# Patient Record
Sex: MALE | Race: WHITE | NOT HISPANIC OR LATINO | Employment: UNEMPLOYED | URBAN - METROPOLITAN AREA
[De-identification: names, ages, dates, MRNs, and addresses within clinical notes are randomized per-mention and may not be internally consistent; named-entity substitution may affect disease eponyms.]

---

## 2023-07-12 ENCOUNTER — OFFICE VISIT (OUTPATIENT)
Dept: FAMILY MEDICINE | Facility: CLINIC | Age: 28
End: 2023-07-12
Payer: COMMERCIAL

## 2023-07-12 VITALS
TEMPERATURE: 98.6 F | OXYGEN SATURATION: 95 % | BODY MASS INDEX: 30.49 KG/M2 | RESPIRATION RATE: 18 BRPM | SYSTOLIC BLOOD PRESSURE: 122 MMHG | HEIGHT: 65 IN | HEART RATE: 78 BPM | WEIGHT: 183 LBS | DIASTOLIC BLOOD PRESSURE: 78 MMHG

## 2023-07-12 DIAGNOSIS — Z13.220 LIPID SCREENING: ICD-10-CM

## 2023-07-12 DIAGNOSIS — Z11.4 SCREENING FOR HIV (HUMAN IMMUNODEFICIENCY VIRUS): ICD-10-CM

## 2023-07-12 DIAGNOSIS — Z13.1 SCREENING FOR DIABETES MELLITUS: ICD-10-CM

## 2023-07-12 DIAGNOSIS — Z11.3 ROUTINE SCREENING FOR STI (SEXUALLY TRANSMITTED INFECTION): ICD-10-CM

## 2023-07-12 DIAGNOSIS — Z00.00 VISIT FOR PREVENTIVE HEALTH EXAMINATION: Primary | ICD-10-CM

## 2023-07-12 LAB
CHOLEST SERPL-MCNC: 175 MG/DL
FASTING STATUS PATIENT QL REPORTED: NO
GLUCOSE SERPL-MCNC: 96 MG/DL (ref 70–99)
HDLC SERPL-MCNC: 50 MG/DL
LDLC SERPL CALC-MCNC: 86 MG/DL
NONHDLC SERPL-MCNC: 125 MG/DL
T PALLIDUM AB SER QL: NONREACTIVE
TRIGL SERPL-MCNC: 197 MG/DL

## 2023-07-12 PROCEDURE — 91312 COVID-19 BIVALENT 12+ (PFIZER): CPT | Performed by: FAMILY MEDICINE

## 2023-07-12 PROCEDURE — 86780 TREPONEMA PALLIDUM: CPT | Performed by: FAMILY MEDICINE

## 2023-07-12 PROCEDURE — 90651 9VHPV VACCINE 2/3 DOSE IM: CPT | Performed by: FAMILY MEDICINE

## 2023-07-12 PROCEDURE — 0121A COVID-19 BIVALENT 12+ (PFIZER): CPT | Performed by: FAMILY MEDICINE

## 2023-07-12 PROCEDURE — 80061 LIPID PANEL: CPT | Performed by: FAMILY MEDICINE

## 2023-07-12 PROCEDURE — 87491 CHLMYD TRACH DNA AMP PROBE: CPT | Performed by: FAMILY MEDICINE

## 2023-07-12 PROCEDURE — 99385 PREV VISIT NEW AGE 18-39: CPT | Mod: 25 | Performed by: FAMILY MEDICINE

## 2023-07-12 PROCEDURE — 90471 IMMUNIZATION ADMIN: CPT | Performed by: FAMILY MEDICINE

## 2023-07-12 PROCEDURE — 87389 HIV-1 AG W/HIV-1&-2 AB AG IA: CPT | Performed by: FAMILY MEDICINE

## 2023-07-12 PROCEDURE — 82947 ASSAY GLUCOSE BLOOD QUANT: CPT | Performed by: FAMILY MEDICINE

## 2023-07-12 PROCEDURE — 87591 N.GONORRHOEAE DNA AMP PROB: CPT | Performed by: FAMILY MEDICINE

## 2023-07-12 PROCEDURE — 36415 COLL VENOUS BLD VENIPUNCTURE: CPT | Performed by: FAMILY MEDICINE

## 2023-07-12 ASSESSMENT — ENCOUNTER SYMPTOMS
HEARTBURN: 0
NAUSEA: 0
WEAKNESS: 0
HEADACHES: 0
DIZZINESS: 0
HEMATURIA: 0
MYALGIAS: 0
ABDOMINAL PAIN: 0
EYE PAIN: 0
HEMATOCHEZIA: 0
DYSURIA: 0
COUGH: 0
CHILLS: 0
PALPITATIONS: 0
PARESTHESIAS: 0
SORE THROAT: 0
JOINT SWELLING: 0
ARTHRALGIAS: 0
NERVOUS/ANXIOUS: 0
DIARRHEA: 0
FEVER: 0
CONSTIPATION: 0
SHORTNESS OF BREATH: 0
FREQUENCY: 0

## 2023-07-12 ASSESSMENT — PAIN SCALES - GENERAL: PAINLEVEL: NO PAIN (0)

## 2023-07-12 NOTE — PROGRESS NOTES
"Assessment/Plan.    Preventive.  -see below orders for screening tests and immunizations  -STI screening: female partner in past, no prior testing    Orders Placed This Encounter   Procedures     HPV9 (GARDASIL 9)     COVID-19 BIVALENT 12+ (PFIZER)     HIV Antigen Antibody Combo     Treponema Abs w Reflex to RPR and Titer     Lipid panel reflex to direct LDL Non-fasting     Glucose     ----  Chief complaint: Physical (Also wanting to get HPV vaccine)    Social History     Social History Narrative    Updated 7/12/2023:  Grew up in [place].  Lives with a friend.  No pets.  No kids.  Plans to start a PhD program in fungal ecology at [employer] in fall 2023.     Patient has been advised of split billing: yes.    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  He had significant GI symptoms at that time.  He was then told that sunflower butter was being recalled due to Listeria contamination.  He stopped consuming the sunflower butter and his symptoms improved.  He was never tested for Listeria.    History of positional tachycardia.  No symptoms recently.    Exam  /78 (BP Location: Left arm, Patient Position: Sitting, Cuff Size: Adult Regular)   Pulse 78   Temp 98.6  F (37  C) (Temporal)   Resp 18   Ht 1.66 m (5' 5.35\")   Wt 83 kg (183 lb)   SpO2 95%   BMI 30.12 kg/m       Oropharynx without abnormal masses.  No cervical adenopathy.  Lung fields clear to auscultation bilaterally.  Heart with regular rate and rhythm, no murmurs.  No lower leg edema.  "

## 2023-07-12 NOTE — PROGRESS NOTES
"SUBJECTIVE:   CC: Srinath is an 27 year old who presents for preventative health visit.       7/12/2023     3:03 PM   Additional Questions   Roomed by Mark PEÑALOZA     Healthy Habits:     Getting at least 3 servings of Calcium per day:  NO    Bi-annual eye exam:  NO    Dental care twice a year:  NO    Sleep apnea or symptoms of sleep apnea:  None    Diet:  Regular (no restrictions)    Frequency of exercise:  1 day/week    Duration of exercise:  15-30 minutes    Taking medications regularly:  Not Applicable    Medication side effects:  Not applicable    Additional concerns today:  Yes      Today's PHQ-2 Score:       7/12/2023     2:45 PM   PHQ-2 ( 1999 [employer])   Q1: Little interest or pleasure in doing things 0   Q2: Feeling down, depressed or hopeless 1   PHQ-2 Score 1   Q1: Little interest or pleasure in doing things Not at all   Q2: Feeling down, depressed or hopeless Several days   PHQ-2 Score 1         {Outside tests to abstract? :870524}    {additional problems to add (Optional):279504}  Have you ever done Advance Care Planning? (For example, a Health Directive, POLST, or a discussion with a medical provider or your loved ones about your wishes): { :175268}    Social History     Tobacco Use     Smoking status: Never     Smokeless tobacco: Never   Substance Use Topics     Alcohol use: Not on file     {Rooming staff  Click this link to complete the Prescreen if response below is not for today's visit  Alcohol Use Prescreen >3 drinks/day or > 7 drinks/week.  If the prescreen question answer is YES, complete the full AUDIT  :820381}        7/12/2023     2:44 PM   Alcohol Use   Prescreen: >3 drinks/day or >7 drinks/week? No          No data to display                Last PSA: No results found for: PSA    Reviewed orders with patient. Reviewed health maintenance and updated orders accordingly - { :518049::\"Yes\"}  {Chronicprobdata (optional):621352}    Reviewed and updated as needed this visit by clinical staff    " "Allergies  Meds              Reviewed and updated as needed this visit by Provider                 {HISTORY OPTIONS (Optional):986648}    Review of Systems   Constitutional: Negative for chills and fever.   HENT: Negative for congestion, ear pain, hearing loss and sore throat.    Eyes: Negative for pain and visual disturbance.   Respiratory: Negative for cough and shortness of breath.    Cardiovascular: Negative for chest pain, palpitations and peripheral edema.   Gastrointestinal: Negative for abdominal pain, constipation, diarrhea, heartburn, hematochezia and nausea.   Genitourinary: Negative for dysuria, frequency, genital sores, hematuria, impotence, penile discharge and urgency.   Musculoskeletal: Negative for arthralgias, joint swelling and myalgias.   Skin: Negative for rash.   Neurological: Negative for dizziness, weakness, headaches and paresthesias.   Psychiatric/Behavioral: Negative for mood changes. The patient is not nervous/anxious.      {MALE ROS (Optional):990225::\"CONSTITUTIONAL: NEGATIVE for fever, chills, change in weight\",\"INTEGUMENTARY/SKIN: NEGATIVE for worrisome rashes, moles or lesions\",\"EYES: NEGATIVE for vision changes or irritation\",\"ENT: NEGATIVE for ear, mouth and throat problems\",\"RESP: NEGATIVE for significant cough or SOB\",\"CV: NEGATIVE for chest pain, palpitations or peripheral edema\",\"GI: NEGATIVE for nausea, abdominal pain, heartburn, or change in bowel habits\",\" male: negative for dysuria, hematuria, decreased urinary stream, erectile dysfunction, urethral discharge\",\"MUSCULOSKELETAL: NEGATIVE for significant arthralgias or myalgia\",\"NEURO: NEGATIVE for weakness, dizziness or paresthesias\",\"PSYCHIATRIC: NEGATIVE for changes in mood or affect\"}    OBJECTIVE:   /78 (BP Location: Left arm, Patient Position: Sitting, Cuff Size: Adult Regular)   Pulse 78   Temp 98.6  F (37  C) (Temporal)   Resp 18   Ht 1.66 m (5' 5.35\")   Wt 83 kg (183 lb)   SpO2 95%   BMI 30.12 kg/m  " "    Physical Exam  {Exam Choices (Optional):302300}    {Diagnostic Test Results (Optional):381659::\"Diagnostic Test Results:\",\"Labs reviewed in Epic\"}    ASSESSMENT/PLAN:   {Diag Picklist:992216}    {Patient advised of split billing (Optional):036434}      COUNSELING:   {MALE COUNSELING MESSAGES:212868::\"Reviewed preventive health counseling, as reflected in patient instructions\"}      BMI:   Estimated body mass index is 30.12 kg/m  as calculated from the following:    Height as of this encounter: 1.66 m (5' 5.35\").    Weight as of this encounter: 83 kg (183 lb).   {Weight Management Plan needed for ACO:241677}      He reports that he has never smoked. He has never used smokeless tobacco.      {Counseling Resources  [place] Preventive Services Task Force  Cholesterol Screening  Health diet/nutrition  Pooled Cohorts Equation Calculator  USDA's MyPlate  ASA Prophylaxis  Lung CA Screening  Osteoporosis prevention/bone health :466123}  {Prostate Cancer Screening  Consider for men 55-69 per guidance from USPSTF :995686}    Franky Rodriguez MD  [facility]  "

## 2023-07-13 LAB
C TRACH DNA SPEC QL NAA+PROBE: NEGATIVE
HIV 1+2 AB+HIV1 P24 AG SERPL QL IA: NONREACTIVE
N GONORRHOEA DNA SPEC QL NAA+PROBE: NEGATIVE

## 2023-07-23 PROBLEM — J30.2 SEASONAL ALLERGIC RHINITIS: Status: ACTIVE | Noted: 2023-07-23

## 2024-09-28 ENCOUNTER — HEALTH MAINTENANCE LETTER (OUTPATIENT)
Age: 29
End: 2024-09-28